# Patient Record
Sex: FEMALE | Race: ASIAN | Employment: FULL TIME | URBAN - METROPOLITAN AREA
[De-identification: names, ages, dates, MRNs, and addresses within clinical notes are randomized per-mention and may not be internally consistent; named-entity substitution may affect disease eponyms.]

---

## 2017-01-25 ENCOUNTER — OFFICE VISIT (OUTPATIENT)
Dept: FAMILY MEDICINE CLINIC | Age: 57
End: 2017-01-25

## 2017-01-25 VITALS
TEMPERATURE: 98.2 F | RESPIRATION RATE: 16 BRPM | SYSTOLIC BLOOD PRESSURE: 100 MMHG | HEART RATE: 65 BPM | HEIGHT: 62 IN | DIASTOLIC BLOOD PRESSURE: 60 MMHG | BODY MASS INDEX: 25.58 KG/M2 | OXYGEN SATURATION: 98 % | WEIGHT: 139 LBS

## 2017-01-25 DIAGNOSIS — E83.52 HYPERCALCEMIA: ICD-10-CM

## 2017-01-25 DIAGNOSIS — H91.91 DECREASED HEARING OF RIGHT EAR: ICD-10-CM

## 2017-01-25 DIAGNOSIS — F41.9 ANXIETY: ICD-10-CM

## 2017-01-25 DIAGNOSIS — R00.1 BRADYCARDIA: Primary | ICD-10-CM

## 2017-01-25 RX ORDER — BUSPIRONE HYDROCHLORIDE 10 MG/1
10 TABLET ORAL 3 TIMES DAILY
Qty: 90 TAB | Refills: 1 | Status: SHIPPED | OUTPATIENT
Start: 2017-01-25 | End: 2017-02-22 | Stop reason: SDUPTHER

## 2017-01-25 RX ORDER — BENZONATATE 100 MG/1
200 CAPSULE ORAL
Qty: 42 CAP | Refills: 1 | Status: SHIPPED | OUTPATIENT
Start: 2017-01-25 | End: 2017-02-01

## 2017-01-25 NOTE — PROGRESS NOTES
HISTORY OF PRESENT ILLNESS  Esha Lee is a 55 y.o. female here for follow-up of bradycardia and anxiety. Patient is complaining of upper respiratory tract infection with nonproductive cough nasal congestion. She denies sore throat or fever. . She has history of mild hypercalcemia with a serum calcium of 10.4 and a normal PTH and normal ionized calcium. Patient also has a history of multiple ear infections on the right and is now complaining of decreased hearing. Anxiety   The history is provided by the patient and medical records. This is a chronic problem. The problem has not changed since onset. Pertinent negatives include no chest pain, no abdominal pain, no headaches and no shortness of breath. The symptoms are aggravated by stress. Nothing relieves the symptoms. She has tried nothing for the symptoms. Hearing Problem    The history is provided by the patient. This is a chronic problem. The problem has been gradually worsening. Patient complains that the right ear is affected. There has been no fever. The patient is experiencing no pain. Associated symptoms include hearing loss. Pertinent negatives include no headaches, no sore throat, no abdominal pain and no vomiting. Her past medical history is significant for chronic ear infection and tympanostomy tube. Other   The history is provided by the medical records (Patient has history of hypercalcemia which is very mild. ). This is a chronic problem. Pertinent negatives include no chest pain, no abdominal pain, no headaches and no shortness of breath. Nothing aggravates the symptoms. Nothing relieves the symptoms. She has tried nothing for the symptoms. Heart Problem    The history is provided by the medical records (She has history of bradycardia which is asymptomatic). This is a chronic problem. The problem has not changed since onset. The problem is associated with nothing.  Pertinent negatives include no diaphoresis, no malaise/fatigue, no numbness, no chest pain, no chest pressure, no exertional chest pressure, no irregular heartbeat, no near-syncope, no PND, no syncope, no abdominal pain, no vomiting, no headaches, no dizziness, no weakness and no shortness of breath. Risk factors include no risk factors. Review of Systems   Constitutional: Negative for diaphoresis and malaise/fatigue. HENT: Positive for hearing loss. Negative for sore throat. Respiratory: Negative for shortness of breath. Cardiovascular: Negative for chest pain, syncope, PND and near-syncope. Gastrointestinal: Negative for abdominal pain and vomiting. Neurological: Negative for dizziness, weakness, numbness and headaches. Physical Exam   Constitutional: She is oriented to person, place, and time. She appears well-developed and well-nourished. HENT:   Head: Normocephalic and atraumatic. Cardiovascular: Normal rate, regular rhythm, normal heart sounds and intact distal pulses. Exam reveals no gallop and no friction rub. No murmur heard. Pulmonary/Chest: Effort normal and breath sounds normal. No respiratory distress. She has no wheezes. She has no rales. Musculoskeletal: She exhibits no edema. Neurological: She is alert and oriented to person, place, and time. No cranial nerve deficit. Psychiatric: She has a normal mood and affect. Her behavior is normal. Judgment and thought content normal.   Nursing note and vitals reviewed. ASSESSMENT and PLAN    ICD-10-CM ICD-9-CM    1. Bradycardia R00.1 427.89    2. Anxiety F41.9 300.00 busPIRone (BUSPAR) 10 mg tablet   3. Hypercalcemia E83.52 275.42 CALCIUM, IONIZED   4. Decreased hearing of right ear H91.91 389.9 REFERRAL TO ENT-OTOLARYNGOLOGY     Follow-up Disposition:  Return in about 4 weeks (around 2/22/2017).

## 2017-01-25 NOTE — MR AVS SNAPSHOT
Visit Information Date & Time Provider Department Dept. Phone Encounter #  
 1/25/2017  9:30 AM Camille Nieto MD [facility] [place] [phone] [id number] Follow-up Instructions Return in about 4 weeks (around 2/22/2017). Your Appointments 2/22/2017  8:30 AM  
Follow Up with Camille Nieto MD  
[facility]) Appt Note: 4 weeks f/u  
 [address] [facility] [address] [address]  
[phone]  
  
   
 [facility] [id number] Upcoming Health Maintenance Date Due Hepatitis C Screening 1960 DTaP/Tdap/Td series (1 - Tdap) 5/14/1982 PAP AKA CERVICAL CYTOLOGY 5/14/1982 BREAST CANCER SCRN MAMMOGRAM 5/14/2011 FOBT Q 1 YEAR AGE 50-75 5/14/2011 Allergies as of 1/25/2017  Review Complete On: 1/25/2017 By: Camille Nieto MD  
 No Known Allergies Current Immunizations  Never Reviewed No immunizations on file. Not reviewed this visit You Were Diagnosed With   
  
 Codes Comments Bradycardia    -  Primary ICD-10-CM: R00.1 ICD-9-CM: 427.89 Anxiety     ICD-10-CM: F41.9 ICD-9-CM: 300.00 Hypercalcemia     ICD-10-CM: T54.52 
ICD-9-CM: 275.42 Decreased hearing of right ear     ICD-10-CM: H91.91 
ICD-9-CM: 389. 9 Vitals BP Pulse Temp Resp Height(growth percentile) Weight(growth percentile) 100/60 (BP 1 Location: Left arm, BP Patient Position: Sitting) 65 98.2 °F (36.8 °C) (Oral) 16 5' 2\" (1.575 m) 139 lb (63 kg) SpO2 BMI OB Status Smoking Status 98% 25.42 kg/m2 Menopause Never Smoker Vitals History BMI and BSA Data Body Mass Index Body Surface Area  
 25.42 kg/m 2 1.66 m 2 Preferred Pharmacy Pharmacy Name Phone [address] [phone] Your Updated Medication List  
  
   
 This list is accurate as of: 1/25/17 11:02 AM.  Always use your most recent med list.  
  
  
  
  
 benzonatate 100 mg capsule Commonly known as:  TESSALON PERLES Take 2 Caps by mouth three (3) times daily as needed for Cough for up to 7 days. busPIRone 10 mg tablet Commonly known as:  BUSPAR Take 1 Tab by mouth three (3) times daily. Prescriptions Sent to Pharmacy Refills  
 busPIRone (BUSPAR) 10 mg tablet 1 Sig: Take 1 Tab by mouth three (3) times daily. Class: Normal  
 Pharmacy: [address] [place] [address] Ph #: [phone] Route: Oral  
 benzonatate (TESSALON PERLES) 100 mg capsule 1 Sig: Take 2 Caps by mouth three (3) times daily as needed for Cough for up to 7 days. Class: Normal  
 Pharmacy: [address] [place] [address] Ph #: [phone] Route: Oral  
  
We Performed the Following CALCIUM, IONIZED [id number] CPT(R)] REFERRAL TO ENT-OTOLARYNGOLOGY [RVG24 Custom] Comments:  
 Please evaluate patient for decreased hearing on right Follow-up Instructions Return in about 4 weeks (around 2/22/2017). To-Do List   
 01/25/2017 Lab:  CALCIUM, IONIZED Referral Information Referral ID Referred By Referred To  
  
 [id number] Jone Houston RADHA Not Available Visits Status Start Date End Date 1 New Request 1/25/17 1/25/18 If your referral has a status of pending review or denied, additional information will be sent to support the outcome of this decision. Introducing [facility] & HEALTH SERVICES! Dear oLc Amador: 
Thank you for requesting a Tarena account. Our records indicate that you already have an active Tarena account. You can access your account anytime at [email or web address] Did you know that you can access your hospital and ER discharge instructions at any time in Tarena?   You can also review all of your test results from your hospital stay or ER visit. Additional Information If you have questions, please visit the Frequently Asked Questions section of the StopTheHacker website at [email or web address] [email or web address] Remember, StopTheHacker is NOT to be used for urgent needs. For medical emergencies, dial 911. Now available from your iPhone and Android! Please provide this summary of care documentation to your next provider. Your primary care clinician is listed as Jessy Norris. If you have any questions after today's visit, please call [phone].

## 2017-01-27 LAB — CA-I SERPL ISE-MCNC: 6 MG/DL (ref 4.5–5.6)

## 2017-02-22 ENCOUNTER — OFFICE VISIT (OUTPATIENT)
Dept: FAMILY MEDICINE CLINIC | Age: 57
End: 2017-02-22

## 2017-02-22 ENCOUNTER — HOSPITAL ENCOUNTER (OUTPATIENT)
Dept: LAB | Age: 57
Discharge: HOME | End: 2017-02-22

## 2017-02-22 VITALS
SYSTOLIC BLOOD PRESSURE: 100 MMHG | WEIGHT: 141 LBS | DIASTOLIC BLOOD PRESSURE: 60 MMHG | OXYGEN SATURATION: 99 % | BODY MASS INDEX: 25.95 KG/M2 | TEMPERATURE: 98 F | RESPIRATION RATE: 16 BRPM | HEIGHT: 62 IN | HEART RATE: 48 BPM

## 2017-02-22 DIAGNOSIS — R73.03 PREDIABETES: ICD-10-CM

## 2017-02-22 DIAGNOSIS — R53.83 FATIGUE, UNSPECIFIED TYPE: ICD-10-CM

## 2017-02-22 DIAGNOSIS — E78.5 DYSLIPIDEMIA: Primary | ICD-10-CM

## 2017-02-22 DIAGNOSIS — E55.9 VITAMIN D DEFICIENCY: ICD-10-CM

## 2017-02-22 DIAGNOSIS — F41.9 ANXIETY: ICD-10-CM

## 2017-02-22 DIAGNOSIS — R00.2 PALPITATION: ICD-10-CM

## 2017-02-22 PROCEDURE — 99001 SPECIMEN HANDLING PT-LAB: CPT | Performed by: INTERNAL MEDICINE

## 2017-02-22 RX ORDER — BUSPIRONE HYDROCHLORIDE 10 MG/1
10 TABLET ORAL 2 TIMES DAILY
COMMUNITY

## 2017-02-22 NOTE — MR AVS SNAPSHOT
Visit Information Date & Time Provider Department Dept. Phone Encounter #  
 2/22/2017  8:30 AM Kehinde Saucedo MD [facility] [place] [phone] [id number] Follow-up Instructions Return in about 4 weeks (around 3/22/2017). Upcoming Health Maintenance Date Due Hepatitis C Screening 1960 DTaP/Tdap/Td series (1 - Tdap) 5/14/1982 PAP AKA CERVICAL CYTOLOGY 5/14/1982 BREAST CANCER SCRN MAMMOGRAM 5/14/2011 FOBT Q 1 YEAR AGE 50-75 5/14/2011 Allergies as of 2/22/2017  Review Complete On: 2/22/2017 By: Kehinde Saucedo MD  
 No Known Allergies Current Immunizations  Never Reviewed No immunizations on file. Not reviewed this visit You Were Diagnosed With   
  
 Codes Comments Dyslipidemia    -  Primary ICD-10-CM: E78.5 ICD-9-CM: 272.4 Prediabetes     ICD-10-CM: R73.03 
ICD-9-CM: 790.29 Vitamin D deficiency     ICD-10-CM: E55.9 ICD-9-CM: 268.9 Anxiety     ICD-10-CM: F41.9 ICD-9-CM: 300.00 Fatigue, unspecified type     ICD-10-CM: R53.83 ICD-9-CM: 780.79 Palpitation     ICD-10-CM: R00.2 ICD-9-CM: 785.1 Vitals BP  
  
  
  
  
  
 100/60 (BP 1 Location: Right arm, BP Patient Position: Sitting) Vitals History BMI and BSA Data Body Mass Index Body Surface Area 25.79 kg/m 2 1.67 m 2 Preferred Pharmacy Pharmacy Name Phone [address] [phone] Your Updated Medication List  
  
   
This list is accurate as of: 2/22/17 10:11 AM.  Always use your most recent med list.  
  
  
  
  
 busPIRone 10 mg tablet Commonly known as:  BUSPAR Take 10 mg by mouth two (2) times a day. We Performed the Following AMB POC EKG ROUTINE W/ 12 LEADS, INTER & REP [69521 CPT(R)] CBC WITH AUTOMATED DIFF [01151 CPT(R)] HEMOGLOBIN A1C WITH EAG [31581 CPT(R)] LIPID PANEL [04422 CPT(R)] METABOLIC PANEL, COMPREHENSIVE [00658 CPT(R)] REFERRAL TO PSYCHIATRY [REF91 Custom] Comments:  
 Please evaluate patient for anxiety. Refer to Dr. Manny Moffett THYROID CASCADE PROFILE [[id number] Custom] Follow-up Instructions Return in about 4 weeks (around 3/22/2017). To-Do List   
 02/22/2017 Lab:  CBC WITH AUTOMATED DIFF   
  
 02/22/2017 ECG:  EVENT MONITOR POST SYMPTOMS   
  
 02/22/2017 Lab:  HEMOGLOBIN A1C WITH EAG   
  
 02/22/2017 Lab:  LIPID PANEL   
  
 02/22/2017 Lab:  METABOLIC PANEL, COMPREHENSIVE   
  
 02/22/2017 Lab:  THYROID CASCADE PROFILE Referral Information Referral ID Referred By Referred To  
  
 [id number] Anastasia GARCIA Not Available Visits Status Start Date End Date 1 New Request 2/22/17 2/22/18 If your referral has a status of pending review or denied, additional information will be sent to support the outcome of this decision. Referral ID Referred By Referred To  
 [id number] Anastasia GARCIA Not Available Visits Status Start Date End Date 1 New Request 2/22/17 2/22/18 If your referral has a status of pending review or denied, additional information will be sent to support the outcome of this decision. Patient Instructions Purchase Kava Kava, passion flower and chamomile tea. You can also use Sleepy time tea before bedtime. Introducing [facility] & HEALTH SERVICES! Dear Qi Bryant: 
Thank you for requesting a Savveo account. Our records indicate that you already have an active Savveo account. You can access your account anytime at [email or web address]"/FOBO Did you know that you can access your hospital and ER discharge instructions at any time in Savveo? You can also review all of your test results from your hospital stay or ER visit. Additional Information If you have questions, please visit the Frequently Asked Questions section of the [employer]book website at [email or web address] [email or web address] Remember, Second Half Playbook is NOT to be used for urgent needs. For medical emergencies, dial 911. Now available from your iPhone and Android! Please provide this summary of care documentation to your next provider. Your primary care clinician is listed as Saman Bonds. If you have any questions after today's visit, please call [phone].

## 2017-02-22 NOTE — PATIENT INSTRUCTIONS
Purchase Kava Kava, passion flower and chamomile tea. You can also use Sleepy time tea before bedtime.

## 2017-02-22 NOTE — PROGRESS NOTES
HISTORY OF PRESENT ILLNESS  Jorge Luis Alamo is a 55 y.o. female here for follow-up of anxiety. Patient is complaining of feeling fatigued. She also complains of having insomnia. It is noted that she does have a vitamin D deficiency as well as dyslipidemia. She has a history of prediabetes. . She also is complaining of anxiety with feeling tremulous. She is using BuSpar for anxiety with some relief. Patient is also complaining of occasional palpitations. There is no chest pain or shortness of breath or dizziness. Anxiety   The history is provided by the patient and medical records. This is a chronic problem. The problem has been gradually improving. Pertinent negatives include no chest pain, no abdominal pain, no headaches and no shortness of breath. The symptoms are aggravated by stress. The symptoms are relieved by medications. Treatments tried: BuSpar. The treatment provided moderate relief. Fatigue   The history is provided by the patient and medical records. This is a chronic problem. The problem has not changed since onset. Pertinent negatives include no chest pain, no abdominal pain, no headaches and no shortness of breath. Nothing aggravates the symptoms. Nothing relieves the symptoms. She has tried nothing for the symptoms. Cholesterol Problem   The history is provided by the patient and medical records. This is a chronic problem. The problem has not changed since onset. Pertinent negatives include no chest pain, no abdominal pain, no headaches and no shortness of breath. The symptoms are aggravated by eating. Nothing relieves the symptoms. She has tried nothing for the symptoms. High Blood Sugar   The history is provided by the patient and medical records. This is a chronic problem. The problem has not changed since onset. Pertinent negatives include no chest pain, no abdominal pain, no headaches and no shortness of breath. The symptoms are aggravated by eating. Nothing relieves the symptoms.  She has tried nothing for the symptoms. Palpitations    The history is provided by the patient. This is a new problem. The problem has not changed since onset. The problem occurs every several days. The problem is associated with nothing. Associated symptoms include malaise/fatigue. Pertinent negatives include no diaphoresis, no numbness, no chest pain, no chest pressure, no claudication, no exertional chest pressure, no irregular heartbeat, no near-syncope, no abdominal pain, no headaches, no dizziness, no weakness and no shortness of breath. Risk factors include no risk factors. She has tried nothing for the symptoms. Her past medical history does not include hyperthyroidism, valve disorder, anemia, heart disease, DM, hypertension, atrial fibrillation or SVT. Other   The history is provided by the medical records (Patient has history of vitamin D deficiency). This is a chronic problem. The problem has not changed since onset. Pertinent negatives include no chest pain, no abdominal pain, no headaches and no shortness of breath. Nothing aggravates the symptoms. Nothing relieves the symptoms. She has tried nothing for the symptoms. No Known Allergies  No current outpatient prescriptions on file prior to visit. No current facility-administered medications on file prior to visit. Past Medical History:   Diagnosis Date    Dyslipidemia     Prediabetes      Past Surgical History:   Procedure Laterality Date    HX BREAST BIOPSY      NH OTOPLASTY PROTRUDING EAR W/WO SIZE RDCTJ       Family History   Problem Relation Age of Onset    No Known Problems Mother     Cancer Father     Alzheimer Sister        Social History     Social History    Marital status: SINGLE     Spouse name: N/A    Number of children: N/A    Years of education: N/A     Occupational History    Not on file.      Social History Main Topics    Smoking status: Never Smoker    Smokeless tobacco: Never Used    Alcohol use Yes      Comment: harly any     Drug use: No    Sexual activity: Not Currently     Other Topics Concern    Not on file     Social History Narrative       Review of Systems   Constitutional: Positive for fatigue and malaise/fatigue. Negative for diaphoresis. Respiratory: Negative. Negative for shortness of breath. Cardiovascular: Positive for palpitations. Negative for chest pain, claudication and near-syncope. Gastrointestinal: Negative for abdominal pain. Musculoskeletal: Negative. Neurological: Negative. Negative for dizziness, weakness, numbness and headaches. Endo/Heme/Allergies: Negative. Psychiatric/Behavioral: Negative. Visit Vitals    /60 (BP 1 Location: Right arm, BP Patient Position: Sitting)    Pulse (!) 48    Temp 98 °F (36.7 °C) (Oral)    Resp 16    Ht 5' 2\" (1.575 m)    Wt 141 lb (64 kg)    SpO2 99%    BMI 25.79 kg/m2       Physical Exam   Constitutional: She is oriented to person, place, and time. She appears well-developed and well-nourished. HENT:   Head: Normocephalic and atraumatic. Cardiovascular: Normal rate, regular rhythm, normal heart sounds and intact distal pulses. Exam reveals no gallop and no friction rub. No murmur heard. Pulmonary/Chest: Effort normal and breath sounds normal. No respiratory distress. She has no wheezes. She has no rales. Musculoskeletal: She exhibits no edema. Neurological: She is alert and oriented to person, place, and time. No cranial nerve deficit. Psychiatric: She has a normal mood and affect. Her behavior is normal. Judgment and thought content normal.   Nursing note and vitals reviewed. ASSESSMENT and PLAN    ICD-10-CM ICD-9-CM    1. Dyslipidemia E78.5 272.4 LIPID PANEL      METABOLIC PANEL, COMPREHENSIVE   2. Prediabetes T55.64 561.89 METABOLIC PANEL, COMPREHENSIVE      HEMOGLOBIN A1C WITH EAG   3. Vitamin D deficiency E55.9 268.9    4.  Anxiety F41.9 300.00 THYROID CASCADE PROFILE      REFERRAL TO PSYCHIATRY   5. Fatigue, unspecified type R53.83 780.79 THYROID CASCADE PROFILE      CBC WITH AUTOMATED DIFF      METABOLIC PANEL, COMPREHENSIVE      HEMOGLOBIN A1C WITH EAG   6. Palpitation R00.2 785.1 EVENT MONITOR POST SYMPTOMS     Follow-up Disposition:  Return in about 4 weeks (around 3/22/2017). patient has been advised to continue BuSpar. She also has been advised to try Kava Kava valerian root passion flower and chamomile tea.

## 2017-02-23 LAB
ALBUMIN SERPL-MCNC: 4.7 G/DL (ref 3.5–5.5)
ALBUMIN/GLOB SERPL: 1.8 {RATIO} (ref 1.1–2.5)
ALP SERPL-CCNC: 104 IU/L (ref 39–117)
ALT SERPL-CCNC: 28 IU/L (ref 0–32)
AST SERPL-CCNC: 18 IU/L (ref 0–40)
BASOPHILS # BLD AUTO: 0 X10E3/UL (ref 0–0.2)
BASOPHILS NFR BLD AUTO: 0 %
BILIRUB SERPL-MCNC: 0.5 MG/DL (ref 0–1.2)
BUN SERPL-MCNC: 19 MG/DL (ref 6–24)
BUN/CREAT SERPL: 24 (ref 9–23)
CALCIUM SERPL-MCNC: 10.4 MG/DL (ref 8.7–10.2)
CHLORIDE SERPL-SCNC: 101 MMOL/L (ref 96–106)
CHOLEST SERPL-MCNC: 238 MG/DL (ref 100–199)
CO2 SERPL-SCNC: 25 MMOL/L (ref 18–29)
CREAT SERPL-MCNC: 0.8 MG/DL (ref 0.57–1)
EOSINOPHIL # BLD AUTO: 0.1 X10E3/UL (ref 0–0.4)
EOSINOPHIL NFR BLD AUTO: 2 %
ERYTHROCYTE [DISTWIDTH] IN BLOOD BY AUTOMATED COUNT: 12.6 % (ref 12.3–15.4)
EST. AVERAGE GLUCOSE BLD GHB EST-MCNC: 123 MG/DL
GLOBULIN SER CALC-MCNC: 2.6 G/DL (ref 1.5–4.5)
GLUCOSE SERPL-MCNC: 115 MG/DL (ref 65–99)
HBA1C MFR BLD: 5.9 % (ref 4.8–5.6)
HCT VFR BLD AUTO: 41 % (ref 34–46.6)
HDLC SERPL-MCNC: 56 MG/DL
HGB BLD-MCNC: 13.3 G/DL (ref 11.1–15.9)
IMM GRANULOCYTES # BLD: 0 X10E3/UL (ref 0–0.1)
IMM GRANULOCYTES NFR BLD: 0 %
INTERPRETATION, 910389: NORMAL
LDLC SERPL CALC-MCNC: 132 MG/DL (ref 0–99)
LYMPHOCYTES # BLD AUTO: 2.1 X10E3/UL (ref 0.7–3.1)
LYMPHOCYTES NFR BLD AUTO: 44 %
MCH RBC QN AUTO: 30.2 PG (ref 26.6–33)
MCHC RBC AUTO-ENTMCNC: 32.4 G/DL (ref 31.5–35.7)
MCV RBC AUTO: 93 FL (ref 79–97)
MONOCYTES # BLD AUTO: 0.3 X10E3/UL (ref 0.1–0.9)
MONOCYTES NFR BLD AUTO: 6 %
NEUTROPHILS # BLD AUTO: 2.4 X10E3/UL (ref 1.4–7)
NEUTROPHILS NFR BLD AUTO: 48 %
PLATELET # BLD AUTO: 271 X10E3/UL (ref 150–379)
POTASSIUM SERPL-SCNC: 4.8 MMOL/L (ref 3.5–5.2)
PROT SERPL-MCNC: 7.3 G/DL (ref 6–8.5)
RBC # BLD AUTO: 4.41 X10E6/UL (ref 3.77–5.28)
SODIUM SERPL-SCNC: 139 MMOL/L (ref 134–144)
TRIGL SERPL-MCNC: 252 MG/DL (ref 0–149)
TSH SERPL DL<=0.005 MIU/L-ACNC: 1.38 UIU/ML (ref 0.45–4.5)
VLDLC SERPL CALC-MCNC: 50 MG/DL (ref 5–40)
WBC # BLD AUTO: 4.9 X10E3/UL (ref 3.4–10.8)